# Patient Record
Sex: FEMALE | Race: WHITE
[De-identification: names, ages, dates, MRNs, and addresses within clinical notes are randomized per-mention and may not be internally consistent; named-entity substitution may affect disease eponyms.]

---

## 2019-02-18 ENCOUNTER — HOSPITAL ENCOUNTER (OUTPATIENT)
Dept: HOSPITAL 62 - OD | Age: 69
End: 2019-02-18
Attending: ORTHOPAEDIC SURGERY
Payer: MEDICARE

## 2019-02-18 DIAGNOSIS — Z01.810: Primary | ICD-10-CM

## 2019-02-18 DIAGNOSIS — Z01.812: ICD-10-CM

## 2019-02-18 DIAGNOSIS — Z01.818: ICD-10-CM

## 2019-02-18 LAB
ADD MANUAL DIFF: NO
ANION GAP SERPL CALC-SCNC: 13 MMOL/L (ref 5–19)
BASOPHILS # BLD AUTO: 0 10^3/UL (ref 0–0.2)
BASOPHILS NFR BLD AUTO: 0.3 % (ref 0–2)
BUN SERPL-MCNC: 12 MG/DL (ref 7–20)
CALCIUM: 9.9 MG/DL (ref 8.4–10.2)
CHLORIDE SERPL-SCNC: 100 MMOL/L (ref 98–107)
CO2 SERPL-SCNC: 27 MMOL/L (ref 22–30)
EOSINOPHIL # BLD AUTO: 0.1 10^3/UL (ref 0–0.6)
EOSINOPHIL NFR BLD AUTO: 1 % (ref 0–6)
ERYTHROCYTE [DISTWIDTH] IN BLOOD BY AUTOMATED COUNT: 12.9 % (ref 11.5–14)
GLUCOSE SERPL-MCNC: 99 MG/DL (ref 75–110)
HCT VFR BLD CALC: 45.3 % (ref 36–47)
HGB BLD-MCNC: 16 G/DL (ref 12–15.5)
LYMPHOCYTES # BLD AUTO: 1.3 10^3/UL (ref 0.5–4.7)
LYMPHOCYTES NFR BLD AUTO: 21.3 % (ref 13–45)
MCH RBC QN AUTO: 32.9 PG (ref 27–33.4)
MCHC RBC AUTO-ENTMCNC: 35.3 G/DL (ref 32–36)
MCV RBC AUTO: 93 FL (ref 80–97)
MONOCYTES # BLD AUTO: 0.5 10^3/UL (ref 0.1–1.4)
MONOCYTES NFR BLD AUTO: 8.9 % (ref 3–13)
NEUTROPHILS # BLD AUTO: 4.1 10^3/UL (ref 1.7–8.2)
NEUTS SEG NFR BLD AUTO: 68.5 % (ref 42–78)
PLATELET # BLD: 227 10^3/UL (ref 150–450)
POTASSIUM SERPL-SCNC: 4.4 MMOL/L (ref 3.6–5)
RBC # BLD AUTO: 4.86 10^6/UL (ref 3.72–5.28)
SODIUM SERPL-SCNC: 139.9 MMOL/L (ref 137–145)
TOTAL CELLS COUNTED % (AUTO): 100 %
WBC # BLD AUTO: 6 10^3/UL (ref 4–10.5)

## 2019-02-18 PROCEDURE — 36415 COLL VENOUS BLD VENIPUNCTURE: CPT

## 2019-02-18 PROCEDURE — 80048 BASIC METABOLIC PNL TOTAL CA: CPT

## 2019-02-18 PROCEDURE — 71046 X-RAY EXAM CHEST 2 VIEWS: CPT

## 2019-02-18 PROCEDURE — 85025 COMPLETE CBC W/AUTO DIFF WBC: CPT

## 2019-02-18 PROCEDURE — 93005 ELECTROCARDIOGRAM TRACING: CPT

## 2019-02-18 PROCEDURE — 93010 ELECTROCARDIOGRAM REPORT: CPT

## 2019-02-18 NOTE — EKG REPORT
SEVERITY:- BORDERLINE ECG -

SINUS RHYTHM

BORDERLINE LEFT AXIS DEVIATION

CONSIDER INFERIOR INFARCT

:

Confirmed by: Yasmany Diego MD 18-Feb-2019 11:03:31

## 2019-02-18 NOTE — RADIOLOGY REPORT (SQ)
EXAM DESCRIPTION:  CHEST PA/LATERAL



COMPLETED DATE/TIME:  2/18/2019 10:18 am



REASON FOR STUDY:  PRE OP



COMPARISON:  None.



EXAM PARAMETERS:  NUMBER OF VIEWS: two views

TECHNIQUE: Digital Frontal and Lateral radiographic views of the chest acquired.

RADIATION DOSE: NA

LIMITATIONS: none



FINDINGS:  LUNGS AND PLEURA:  Low lung volumes.  No opacities, masses or pneumothorax. No pleural eff
usion.

MEDIASTINUM AND HILAR STRUCTURES: No masses or contour abnormalities.

HEART AND VASCULAR STRUCTURES: Heart normal size.  No evidence for failure.

BONES:  Dextroconvex scoliosis of the thoracic spine.

HARDWARE: None in the chest.

OTHER: No other significant finding.



IMPRESSION:  1.  Low lung volumes. NO SIGNIFICANT RADIOGRAPHIC FINDING IN THE CHEST.



TECHNICAL DOCUMENTATION:  JOB ID:  [id number]

 2011 [employer]- All Rights Reserved



Reading location - IP/workstation name: [email or web address]

## 2019-03-04 ENCOUNTER — HOSPITAL ENCOUNTER (INPATIENT)
Dept: HOSPITAL 62 - INOR | Age: 69
LOS: 2 days | Discharge: HOME HEALTH | DRG: 470 | End: 2019-03-06
Attending: ORTHOPAEDIC SURGERY | Admitting: ORTHOPAEDIC SURGERY
Payer: MEDICARE

## 2019-03-04 DIAGNOSIS — Z79.82: ICD-10-CM

## 2019-03-04 DIAGNOSIS — E78.5: ICD-10-CM

## 2019-03-04 DIAGNOSIS — Z86.12: ICD-10-CM

## 2019-03-04 DIAGNOSIS — Z82.3: ICD-10-CM

## 2019-03-04 DIAGNOSIS — Z82.49: ICD-10-CM

## 2019-03-04 DIAGNOSIS — M17.12: Primary | ICD-10-CM

## 2019-03-04 DIAGNOSIS — I10: ICD-10-CM

## 2019-03-04 DIAGNOSIS — M81.0: ICD-10-CM

## 2019-03-04 DIAGNOSIS — Z96.651: ICD-10-CM

## 2019-03-04 PROCEDURE — 84132 ASSAY OF SERUM POTASSIUM: CPT

## 2019-03-04 PROCEDURE — 0SRD0J9 REPLACEMENT OF LEFT KNEE JOINT WITH SYNTHETIC SUBSTITUTE, CEMENTED, OPEN APPROACH: ICD-10-PCS | Performed by: ORTHOPAEDIC SURGERY

## 2019-03-04 PROCEDURE — 94799 UNLISTED PULMONARY SVC/PX: CPT

## 2019-03-04 PROCEDURE — 88311 DECALCIFY TISSUE: CPT

## 2019-03-04 PROCEDURE — 85027 COMPLETE CBC AUTOMATED: CPT

## 2019-03-04 PROCEDURE — 88305 TISSUE EXAM BY PATHOLOGIST: CPT

## 2019-03-04 PROCEDURE — 80048 BASIC METABOLIC PNL TOTAL CA: CPT

## 2019-03-04 PROCEDURE — C1776 JOINT DEVICE (IMPLANTABLE): HCPCS

## 2019-03-04 PROCEDURE — C1713 ANCHOR/SCREW BN/BN,TIS/BN: HCPCS

## 2019-03-04 PROCEDURE — 01402 ANES OPN/ARTH TOT KNE ARTHRP: CPT

## 2019-03-04 PROCEDURE — 36415 COLL VENOUS BLD VENIPUNCTURE: CPT

## 2019-03-04 RX ADMIN — MORPHINE SULFATE PRN MG: 10 INJECTION INTRAMUSCULAR; INTRAVENOUS; SUBCUTANEOUS at 18:40

## 2019-03-04 RX ADMIN — MORPHINE SULFATE PRN MG: 10 INJECTION INTRAMUSCULAR; INTRAVENOUS; SUBCUTANEOUS at 15:24

## 2019-03-04 RX ADMIN — SENNOSIDES, DOCUSATE SODIUM SCH: 50; 8.6 TABLET, FILM COATED ORAL at 17:24

## 2019-03-04 RX ADMIN — IBUPROFEN SCH MLS/HR: 800 INJECTION INTRAVENOUS at 21:55

## 2019-03-04 RX ADMIN — Medication SCH: at 22:08

## 2019-03-04 RX ADMIN — IBUPROFEN SCH MLS/HR: 800 INJECTION INTRAVENOUS at 16:11

## 2019-03-04 RX ADMIN — OXYCODONE HYDROCHLORIDE SCH MG: 10 TABLET, FILM COATED, EXTENDED RELEASE ORAL at 21:56

## 2019-03-04 RX ADMIN — Medication SCH: at 14:18

## 2019-03-04 NOTE — OPERATIVE REPORT
Operative Report


DATE OF SURGERY: 03/04/19


PREOPERATIVE DIAGNOSIS: Left knee arthritis and hypermobility


OPERATION: Left knee arthroplasty


SURGEON: MOHIT JIMENEZ


ANESTHESIA: Spinal


TISSUE REMOVED OR ALTERED: Bone to pathology


COMPLICATIONS: 





None


ESTIMATED BLOOD LOSS: 100


PROCEDURE: 





Implants used:


Femur: Jordan TS size 3 femur, 100 x 15 mm stem


Tibia: Size 3 tibia, 50 x 12 stem


Tibial liner: 13 mm TS insert


Patella: 32 mm oval patella





Preoperative considerations:


On the operating room table suspending the left lower extremity by the ankle 

results in a 45 degrees recurvatum deformity.  There is mild laxity varus valgus

plane.  There is a dramatically positive pivot shift.  Posterior drawer is less 

impressive.  This led to consideration what level implant to use.  The options 

were either TS implant or rotating hinge implant as had been used on the 

contralateral extremity by another surgeon.  Because of presumably of better 

longevity of a less constrained implant in a relatively young patient, I opted 

to proceed with a TS reconstruction with the ability to transition to a rotating

hinge if I felt I did not have adequate stability.





Procedure with the patient supine on the operating table the left the limb is 

prepped and draped in a sterile fashion.  The limb was elevated for 

exsanguination and the tourniquet inflated to 280 torr.  A standard midline 

median parapatellar approach the knee is taken.  Access is gained to the femoral

canal through the intercondylar notch.  Intramedullary alignment instrumentation

used to resect 7 mm of distal femur in 5 of valgus.  Sizing guide indicated a 

size 3 femur.  Appropriate cutting jig is then used to fashion anterior 

posterior and chamfer cuts as well as the posterior stabilized box cut..  A 

trial reduction femur is performed and this is judged to be adequate.





Attention was next turned to the tibia.  Using an extra medullary alignment 

system 9 millimeters was resected off the lateral tibial plateau.  This is sized

to a size 3 tibia.  A trial reduction was now performed with a 3 femur and a 3 

tibia using a 13 millimeters spacer.  There is full extension without recurvatu

m.  And central patellofemoral tracking.  Further flexion with the patella 

reduced to approximately 135 degrees.  The femur was then prepared for an 

intramedullary stem but because of the metadiaphyseal malalignment and a stem 

longer than 50 mm led to a valgus deformity at the articular surface.  Therefore

decision was made to use a 50 mm cemented stem by 12 mm.  While this did not 

bypass the deformity the deformity is well-healed.  The presence of a cemented 

stem should provide adequate support for a T SM plan.  The articular surface the

patella was next resected using an oscillating saw.





All trial implants were removed.  Polymethylmethacrylate with vancomycin is 

mixed and used to cement the above implants in place.  The entire tibial stem is

cemented.  Only the distal 1/3-1/2 of the femoral stem is cemented.  On adequate

curing the cement excess cement was removed the tourniquet was deflated 

hemostasis obtained.  The final 13 mm TS insert is impacted into position as 

well as the securing pin.  Stability of the knee is again assessed assessed.  

There is full extension without hyperextension.  There is minimal if any varus 

valgus play in the knee.  Anterior posterior translation is limited by the 

posterior stabilized box.  The wound is then closed in layers using interrupted 

Vicryl followed by staples.  A sterile compressive dressing was applied and the 

patient returned to recovery room in satisfactory condition.

## 2019-03-04 NOTE — RADIOLOGY REPORT (SQ)
EXAM DESCRIPTION:  KNEE LEFT 2 VIEWS



COMPLETED DATE/TIME:  3/4/2019 1:19 pm



REASON FOR STUDY:  Post OP -Long Cassette in PACU M17.12  UNILATERAL PRIMARY OSTEOARTHRITIS, LEFT KNE
E



COMPARISON:  None.



NUMBER OF VIEWS:  Two view(s).



TECHNIQUE:  Digital radiographic images of the left knee post-procedure.



LIMITATIONS:  None.



FINDINGS:  BONES: No  worrisome or unexpected findings post-procedure.  Deformity proximal fibular an
d tibia, probably  related to prior remote trauma.

DEVICE: Total knee arthroplasty.

SOFT TISSUES:  No worrisome findings.  Expected postoperative soft tissue changes.



IMPRESSION:  1. SATISFACTORY POSTOPERATIVE LEFT KNEE.



TECHNICAL DOCUMENTATION:  JOB ID:  [id number]

 2011 [employer]- All Rights Reserved



Reading location - IP/workstation name: [email or web address]

## 2019-03-05 LAB
ANION GAP SERPL CALC-SCNC: 8 MMOL/L (ref 5–19)
BUN SERPL-MCNC: 13 MG/DL (ref 7–20)
CALCIUM: 9.2 MG/DL (ref 8.4–10.2)
CHLORIDE SERPL-SCNC: 100 MMOL/L (ref 98–107)
CO2 SERPL-SCNC: 26 MMOL/L (ref 22–30)
ERYTHROCYTE [DISTWIDTH] IN BLOOD BY AUTOMATED COUNT: 13.1 % (ref 11.5–14)
GLUCOSE SERPL-MCNC: 142 MG/DL (ref 75–110)
HCT VFR BLD CALC: 37.8 % (ref 36–47)
HGB BLD-MCNC: 13.2 G/DL (ref 12–15.5)
MCH RBC QN AUTO: 32.5 PG (ref 27–33.4)
MCHC RBC AUTO-ENTMCNC: 34.9 G/DL (ref 32–36)
MCV RBC AUTO: 93 FL (ref 80–97)
PLATELET # BLD: 237 10^3/UL (ref 150–450)
POTASSIUM SERPL-SCNC: 4 MMOL/L (ref 3.6–5)
RBC # BLD AUTO: 4.05 10^6/UL (ref 3.72–5.28)
SODIUM SERPL-SCNC: 134 MMOL/L (ref 137–145)
WBC # BLD AUTO: 10 10^3/UL (ref 4–10.5)

## 2019-03-05 RX ADMIN — LANSOPRAZOLE SCH MG: 30 TABLET, ORALLY DISINTEGRATING, DELAYED RELEASE ORAL at 05:53

## 2019-03-05 RX ADMIN — OXYCODONE HYDROCHLORIDE SCH MG: 10 TABLET, FILM COATED, EXTENDED RELEASE ORAL at 09:58

## 2019-03-05 RX ADMIN — Medication SCH: at 05:59

## 2019-03-05 RX ADMIN — LISINOPRIL SCH MG: 10 TABLET ORAL at 09:58

## 2019-03-05 RX ADMIN — OXYCODONE HYDROCHLORIDE PRN MG: 5 TABLET ORAL at 13:25

## 2019-03-05 RX ADMIN — FLUTICASONE PROPIONATE SCH INHALER: 50 SPRAY, METERED NASAL at 09:59

## 2019-03-05 RX ADMIN — IBUPROFEN SCH MLS/HR: 800 INJECTION INTRAVENOUS at 05:53

## 2019-03-05 RX ADMIN — Medication SCH: at 13:28

## 2019-03-05 RX ADMIN — OXYCODONE HYDROCHLORIDE SCH MG: 10 TABLET, FILM COATED, EXTENDED RELEASE ORAL at 21:11

## 2019-03-05 RX ADMIN — HYDROCHLOROTHIAZIDE SCH MG: 12.5 CAPSULE ORAL at 09:57

## 2019-03-05 RX ADMIN — RALOXIFENE SCH MG: 60 TABLET ORAL at 10:00

## 2019-03-05 RX ADMIN — MORPHINE SULFATE PRN MG: 10 INJECTION INTRAMUSCULAR; INTRAVENOUS; SUBCUTANEOUS at 05:48

## 2019-03-05 RX ADMIN — IBUPROFEN SCH MLS/HR: 800 INJECTION INTRAVENOUS at 13:26

## 2019-03-05 RX ADMIN — Medication SCH: at 21:11

## 2019-03-05 RX ADMIN — OXYCODONE HYDROCHLORIDE PRN MG: 5 TABLET ORAL at 19:40

## 2019-03-05 RX ADMIN — IBUPROFEN SCH MLS/HR: 800 INJECTION INTRAVENOUS at 21:11

## 2019-03-05 RX ADMIN — ASPIRIN SCH MG: 81 TABLET, CHEWABLE ORAL at 09:58

## 2019-03-05 RX ADMIN — SENNOSIDES, DOCUSATE SODIUM SCH EACH: 50; 8.6 TABLET, FILM COATED ORAL at 09:59

## 2019-03-05 RX ADMIN — Medication SCH CAP: at 09:59

## 2019-03-05 RX ADMIN — SENNOSIDES, DOCUSATE SODIUM SCH: 50; 8.6 TABLET, FILM COATED ORAL at 17:27

## 2019-03-05 RX ADMIN — MONTELUKAST SODIUM SCH MG: 10 TABLET, FILM COATED ORAL at 09:59

## 2019-03-05 NOTE — PDOC PROGRESS REPORT
Subjective


Progress Note for:: 03/05/19


Reason For Visit: 


LEFT KNEE ARTHRITIS


68-year-old white female now postop day 1 status post left knee arthroplasty for

a combination of osteoarthritis and ligamentous instability.  Patient with 

complaints of pain overnight and limited progress with physical therapy 

yesterday.  Patient is alert, oriented, and appropriate this morning.





Physical Exam


Vital Signs: 


                                        











Temp Pulse Resp BP Pulse Ox


 


 36.8 C   77   18   102/54 L  90 L


 


 03/04/19 22:15  03/04/19 22:15  03/04/19 22:15  03/04/19 22:15  03/04/19 22:15








                                 Intake & Output











 03/04/19 03/05/19 03/06/19





 06:59 06:59 06:59


 


Intake Total  6317 


 


Output Total  510 


 


Balance  5807 


 


Weight  72.5 kg 











Physical Exam: 





Moderately built middle-aged white female sitting up in bed eating breakfast.


General appearance: PRESENT: no acute distress, well-nourished


Head exam: PRESENT: normocephalic


Respiratory exam: PRESENT: unlabored


Cardiovascular exam: PRESENT: RRR


Pulses: PRESENT: +1 pedal pulses bilateral


Vascular exam: PRESENT: normal capillary refill


GI/Abdominal exam: PRESENT: soft


Rectal exam: PRESENT: deferred


Extremities exam: PRESENT: other - Left lower extremity compressive dressing is 

in place.  No evidence evidence of drainage.  Distal neurovascular examination 

is intact.  There is considerable ecchymosis and swelling about the great toe of

uncertain etiology.  There is brisk sub-ungual capillary refill.


Neurological exam: PRESENT: alert, awake, oriented to person, oriented to place,

oriented to time, oriented to situation.  ABSENT: motor sensory deficit


Psychiatric exam: PRESENT: appropriate affect, normal mood.  ABSENT: homicidal 

ideation, suicidal ideation


Skin exam: PRESENT: dry, intact, warm.  ABSENT: cyanosis, rash





Results


Laboratory Results: 


                                        





                                 03/04/19 09:01 





                                        











  03/04/19





  09:01


 


Potassium  4.1











Impressions: 


                                        





Knee X-Ray  03/04/19 12:41


IMPRESSION:  1. SATISFACTORY POSTOPERATIVE LEFT KNEE.


 











Status: Imported from PACS





Assessment & Plan





- Diagnosis


(1) Arthritis of left knee


Is this a current diagnosis for this admission?: Yes   


Plan: 


Patient to be mobilized with physical therapy and weightbearing as tolerated 

basis.  Functional goals at this point are relatively limited in light of the 

patient's motor deficit from distant history of polio.  Anticipate discharge 

home with home health services and DME when functional capacity is appropriate.








- Time


Time Spent with patient: 15-24 minutes


Anticipated discharge: Home with Homehealth


Within: within 24 hours

## 2019-03-06 VITALS — SYSTOLIC BLOOD PRESSURE: 118 MMHG | DIASTOLIC BLOOD PRESSURE: 50 MMHG

## 2019-03-06 LAB
ERYTHROCYTE [DISTWIDTH] IN BLOOD BY AUTOMATED COUNT: 12.8 % (ref 11.5–14)
HCT VFR BLD CALC: 34.1 % (ref 36–47)
HGB BLD-MCNC: 12 G/DL (ref 12–15.5)
MCH RBC QN AUTO: 33.1 PG (ref 27–33.4)
MCHC RBC AUTO-ENTMCNC: 35.3 G/DL (ref 32–36)
MCV RBC AUTO: 94 FL (ref 80–97)
PLATELET # BLD: 188 10^3/UL (ref 150–450)
RBC # BLD AUTO: 3.64 10^6/UL (ref 3.72–5.28)
WBC # BLD AUTO: 8.7 10^3/UL (ref 4–10.5)

## 2019-03-06 RX ADMIN — Medication SCH: at 06:09

## 2019-03-06 RX ADMIN — IBUPROFEN SCH MLS/HR: 800 INJECTION INTRAVENOUS at 06:06

## 2019-03-06 RX ADMIN — LANSOPRAZOLE SCH MG: 30 TABLET, ORALLY DISINTEGRATING, DELAYED RELEASE ORAL at 06:01

## 2019-03-06 RX ADMIN — ASPIRIN SCH MG: 81 TABLET, CHEWABLE ORAL at 09:11

## 2019-03-06 RX ADMIN — MONTELUKAST SODIUM SCH MG: 10 TABLET, FILM COATED ORAL at 09:13

## 2019-03-06 RX ADMIN — LISINOPRIL SCH: 10 TABLET ORAL at 09:15

## 2019-03-06 RX ADMIN — SENNOSIDES, DOCUSATE SODIUM SCH EACH: 50; 8.6 TABLET, FILM COATED ORAL at 09:13

## 2019-03-06 RX ADMIN — Medication SCH CAP: at 09:12

## 2019-03-06 RX ADMIN — OXYCODONE HYDROCHLORIDE SCH MG: 10 TABLET, FILM COATED, EXTENDED RELEASE ORAL at 09:12

## 2019-03-06 RX ADMIN — FLUTICASONE PROPIONATE SCH INHALER: 50 SPRAY, METERED NASAL at 09:12

## 2019-03-06 RX ADMIN — OXYCODONE HYDROCHLORIDE PRN MG: 5 TABLET ORAL at 06:08

## 2019-03-06 RX ADMIN — HYDROCHLOROTHIAZIDE SCH: 12.5 CAPSULE ORAL at 09:15

## 2019-03-06 RX ADMIN — RALOXIFENE SCH MG: 60 TABLET ORAL at 09:12

## 2019-03-06 NOTE — PDOC DISCHARGE SUMMARY
General





- Admit/Disc Date/PCP


Admission Date/Primary Care Provider: 


  03/04/19 07:49





  JUAN MCGILL PA-C





Discharge Date: 03/06/19





- Discharge Diagnosis


(1) Arthritis of left knee


Is this a current diagnosis for this admission?: Yes   





- Additional Information


Resuscitation Status: Full Code


Home Medications: 








Lisinopril/Hydrochlorothiazide [Lisinopril-Hctz 10-12.5 mg Tab] 1 tab PO DAILY 

02/27/19 


Pravastatin Sodium 20 mg PO QHS 02/27/19 


Raloxifene HCl 60 mg PO DAILY 02/27/19 


Tramadol HCl [Ultram 50 mg Tablet] 50 mg PO Q8HP PRN 02/27/19 


Acetaminophen [Tylenol Extra Strength 500 mg Tablet] 500 mg PO TID@12,16,22 03/04/19 


Ascorbic Acid [Vitamin C 500 mg Tablet] 500 mg PO QHS 03/04/19 


Aspirin [Ecotrin 81 mg EC Tablet] 81 mg PO DAILY 03/04/19 


Diphenhydramine HCl [Benadryl Allergy] 25 mg PO QHS 03/04/19 


Fexofenadine HCl [Allegra Allergy] 180 mg PO QAM 03/04/19 


Naproxen Sodium [Aleve] 220 mg PO QAM 03/04/19 











History of Present Illness


History of Present Illness: 


RICH AUGUSTIN is a 68 year old female


Patient is a 68-year-old white female with past medical history significant for 

polio and progressive left knee pain and instability leading to compromised 

ability to ambulate.  Patient is admitted for elective constrained left knee 

arthroplasty





Hospital Course


Hospital Course: 





Patient is admitted through the operating where she undergoes uncomplicated left

 knee arthroplasty using constrained implant.  She is returned to floor in 

satisfactory condition.  Patient's relatively limited ambulator prior to 

admission and ambulation during the hospitalization continues to be as such.  

She does make progress with physical therapy and attains an excellent range of 

motion of -4-84 degrees without any apparent instability.  Left knee dressing is

 changed on the day of discharge.  The wound is well approximated with staples 

without erythema or drainage.  Distal neurovascular examination is intact.





Physical Exam


Vital Signs: 


                                        











Temp Pulse Resp BP Pulse Ox


 


 36.8 C   105 H  18   119/67   90 L


 


 03/05/19 20:16  03/05/19 23:44  03/05/19 23:44  03/05/19 23:44  03/05/19 23:44








                                 Intake & Output











 03/05/19 03/06/19 03/07/19





 06:59 06:59 06:59


 


Intake Total 6317 1880 


 


Output Total 510  


 


Balance 5807 1880 


 


Weight 72.5 kg 73.5 kg 











Physical Exam: 





Middle-aged white female sitting up in a hospital bed.  She is alert, oriented, 

and appropriate.


General appearance: PRESENT: no acute distress, well-nourished


Head exam: PRESENT: normocephalic


Respiratory exam: PRESENT: unlabored


Cardiovascular exam: PRESENT: RRR


Pulses: PRESENT: +1 pedal pulses bilateral


Vascular exam: PRESENT: normal capillary refill


GI/Abdominal exam: PRESENT: soft


Rectal exam: PRESENT: deferred


Extremities exam: PRESENT: other - Left knee incision well approximated with 

staples.  There is no erythema or drainage.  There is moderate induration.  

Distal neurovascular examination is intact.


Neurological exam: PRESENT: alert, awake, oriented to person, oriented to place,

 oriented to time, oriented to situation.  ABSENT: motor sensory deficit


Psychiatric exam: PRESENT: appropriate affect, normal mood.  ABSENT: homicidal 

ideation, suicidal ideation


Skin exam: PRESENT: dry, intact, warm.  ABSENT: cyanosis, rash





Results


Laboratory Results: 


                                        





                                 03/05/19 07:08 





                                        











  03/05/19 03/05/19





  07:08 07:08


 


WBC  10.0 


 


RBC  4.05 


 


Hgb  13.2 


 


Hct  37.8 


 


MCV  93 


 


MCH  32.5 


 


MCHC  34.9 


 


RDW  13.1 


 


Plt Count  237 


 


Sodium   134.0 L


 


Potassium   4.0


 


Chloride   100


 


Carbon Dioxide   26


 


Anion Gap   8


 


BUN   13


 


Creatinine   0.56


 


Est GFR ( Amer)   > 60


 


Est GFR (Non-Af Amer)   > 60


 


Glucose   142 H


 


Calcium   9.2











Impressions: 


                                        





Knee X-Ray  03/04/19 12:41


IMPRESSION:  1. SATISFACTORY POSTOPERATIVE LEFT KNEE.


 











Status: Imported from PACS





Qualifiers





- *


PATIENT BEING DISCHARGED WITH ANY OF THE FOLLOWING DIAGNOSIS: No


VTE patient discharged on overlapping Therapy?: Yes





Plan


Discharge Plan: 





Patient be discharged home with home health services and DME.  Follow-up with 

Dr. Hamilton and [facility] for surgery in 2 weeks for staple removal.


Time Spent: Less than 30 Minutes